# Patient Record
Sex: MALE | Race: WHITE
[De-identification: names, ages, dates, MRNs, and addresses within clinical notes are randomized per-mention and may not be internally consistent; named-entity substitution may affect disease eponyms.]

---

## 2019-07-03 ENCOUNTER — HOSPITAL ENCOUNTER (OUTPATIENT)
Age: 64
End: 2019-07-03
Payer: COMMERCIAL

## 2019-07-03 VITALS — BODY MASS INDEX: 32.1 KG/M2

## 2019-07-03 DIAGNOSIS — Z12.5: ICD-10-CM

## 2019-07-03 DIAGNOSIS — R53.83: ICD-10-CM

## 2019-07-03 DIAGNOSIS — Z00.00: Primary | ICD-10-CM

## 2019-07-03 DIAGNOSIS — Z51.81: ICD-10-CM

## 2019-07-03 DIAGNOSIS — E29.1: ICD-10-CM

## 2019-07-03 LAB
ALANINE AMINOTRANSFER ALT/SGPT: 31 U/L (ref 16–61)
ALBUMIN SERPL-MCNC: 3.8 G/DL (ref 3.2–5)
ALKALINE PHOSPHATASE: 62 U/L (ref 45–117)
ANION GAP: 10 (ref 5–15)
AST(SGOT): 23 U/L (ref 15–37)
BUN SERPL-MCNC: 16 MG/DL (ref 7–18)
BUN/CREAT RATIO: 18 RATIO (ref 10–20)
CALCIUM SERPL-MCNC: 8.5 MG/DL (ref 8.5–10.1)
CARBON DIOXIDE: 26 MMOL/L (ref 21–32)
CHLORIDE: 107 MMOL/L (ref 98–107)
CHOLEST SERPL-MCNC: 136 MG/DL
DEPRECATED RDW RBC: 43.5 FL (ref 35.1–43.9)
DIFFERENTIAL INDICATED: (no result)
ERYTHROCYTE [DISTWIDTH] IN BLOOD: 13.1 % (ref 11.6–14.6)
EST GLOM FILT RATE - AFR AMER: 111 ML/MIN (ref 60–?)
GLOBULIN: 3.1 G/DL (ref 2.2–4.2)
GLUCOSE: 95 MG/DL (ref 74–106)
HCT VFR BLD AUTO: 44.3 % (ref 40–54)
HEMOGLOBIN: 15.3 G/DL (ref 13–16.5)
HGB BLD-MCNC: 15.3 G/DL (ref 13–16.5)
IMMATURE GRANULOCYTES COUNT: 0.02 X10^3/UL (ref 0–0)
MCV RBC: 91.5 FL (ref 80–94)
MEAN CORP HGB CONC: 34.5 G/GL (ref 32–36)
MEAN PLATELET VOL.: 10 FL (ref 6.2–12)
PLATELET # BLD: 164 K/MM3 (ref 150–450)
PLATELET COUNT: 164 K/MM3 (ref 150–450)
POSITIVE COUNT: NO
POSITIVE DIFFERENTIAL: NO
POSITIVE MORPHOLOGY: NO
POTASSIUM: 3.8 MMOL/L (ref 3.5–5.1)
PSA,TOTAL - ANNUAL SCREEN: 1.36 NG/ML (ref 0–4)
RBC # BLD AUTO: 4.84 M/MM3 (ref 4.6–6.2)
RBC DISTRIBUTION WIDTH CV: 13.1 % (ref 11.6–14.6)
RBC DISTRIBUTION WIDTH SD: 43.5 FL (ref 35.1–43.9)
TRIGLYCERIDES: 60 MG/DL
VITAMIN B12: 402 PG/ML (ref 211–911)
VITAMIN D,25 HYDROXY: 51.5 NG/ML (ref 29.95–100.01)
VLDLC SERPL-MCNC: 12 MG/DL (ref 5–40)
WBC # BLD AUTO: 5.4 K/MM3 (ref 4.4–11)
WHITE BLOOD COUNT: 5.4 K/MM3 (ref 4.4–11)

## 2019-07-03 PROCEDURE — 80053 COMPREHEN METABOLIC PANEL: CPT

## 2019-07-03 PROCEDURE — 84402 ASSAY OF FREE TESTOSTERONE: CPT

## 2019-07-03 PROCEDURE — 80061 LIPID PANEL: CPT

## 2019-07-03 PROCEDURE — 84403 ASSAY OF TOTAL TESTOSTERONE: CPT

## 2019-07-03 PROCEDURE — 82306 VITAMIN D 25 HYDROXY: CPT

## 2019-07-03 PROCEDURE — 36415 COLL VENOUS BLD VENIPUNCTURE: CPT

## 2019-07-03 PROCEDURE — 82607 VITAMIN B-12: CPT

## 2019-07-03 PROCEDURE — 84443 ASSAY THYROID STIM HORMONE: CPT

## 2019-07-03 PROCEDURE — 84153 ASSAY OF PSA TOTAL: CPT

## 2019-07-03 PROCEDURE — G0103 PSA SCREENING: HCPCS

## 2019-07-03 PROCEDURE — 85025 COMPLETE CBC W/AUTO DIFF WBC: CPT

## 2019-07-06 LAB — TESTOSTERONE, FREE: 7.04 NG/DL (ref 5–21)

## 2019-07-07 LAB
TESTOST FREE MFR SERPL: 2.14 % (ref 1.5–4.2)
TESTOSTERONE, % FREE: 2.14 % (ref 1.5–4.2)

## 2021-12-13 ENCOUNTER — HOSPITAL ENCOUNTER (OUTPATIENT)
Dept: HOSPITAL 100 - MS3OUT | Age: 66
Discharge: HOME | End: 2021-12-13
Payer: COMMERCIAL

## 2021-12-13 VITALS
SYSTOLIC BLOOD PRESSURE: 123 MMHG | TEMPERATURE: 99.3 F | OXYGEN SATURATION: 94 % | RESPIRATION RATE: 16 BRPM | HEART RATE: 68 BPM | DIASTOLIC BLOOD PRESSURE: 75 MMHG

## 2021-12-13 VITALS
OXYGEN SATURATION: 95 % | SYSTOLIC BLOOD PRESSURE: 137 MMHG | RESPIRATION RATE: 16 BRPM | HEART RATE: 67 BPM | DIASTOLIC BLOOD PRESSURE: 82 MMHG | TEMPERATURE: 99.5 F

## 2021-12-13 VITALS — BODY MASS INDEX: 29.6 KG/M2

## 2021-12-13 VITALS
RESPIRATION RATE: 16 BRPM | SYSTOLIC BLOOD PRESSURE: 122 MMHG | TEMPERATURE: 99.2 F | HEART RATE: 61 BPM | OXYGEN SATURATION: 93 % | DIASTOLIC BLOOD PRESSURE: 66 MMHG

## 2021-12-13 DIAGNOSIS — Z23: Primary | ICD-10-CM

## 2021-12-13 DIAGNOSIS — U07.1: ICD-10-CM

## 2021-12-13 DIAGNOSIS — I10: ICD-10-CM

## 2021-12-13 PROCEDURE — A4216 STERILE WATER/SALINE, 10 ML: HCPCS

## 2021-12-13 PROCEDURE — M0245: HCPCS

## 2021-12-13 RX ADMIN — SODIUM CHLORIDE, PRESERVATIVE FREE 0 ML: 5 INJECTION INTRAVENOUS at 15:32

## 2023-01-31 ENCOUNTER — HOSPITAL ENCOUNTER (OUTPATIENT)
Age: 68
Discharge: HOME | End: 2023-01-31
Payer: COMMERCIAL

## 2023-01-31 DIAGNOSIS — I73.9: Primary | ICD-10-CM

## 2023-01-31 PROCEDURE — 93924 LWR XTR VASC STDY BILAT: CPT

## 2023-09-11 ENCOUNTER — APPOINTMENT (OUTPATIENT)
Dept: URBAN - METROPOLITAN AREA CLINIC 204 | Age: 68
Setting detail: DERMATOLOGY
End: 2023-09-12

## 2023-09-11 PROCEDURE — 99213 OFFICE O/P EST LOW 20 MIN: CPT | Mod: 25

## 2023-09-11 PROCEDURE — OTHER MIPS QUALITY: OTHER

## 2023-09-11 PROCEDURE — 17004 DESTROY PREMAL LESIONS 15/>: CPT

## 2024-02-21 ENCOUNTER — HOSPITAL ENCOUNTER (OUTPATIENT)
Dept: HOSPITAL 100 - LAB | Age: 69
Discharge: HOME | End: 2024-02-21
Payer: COMMERCIAL

## 2024-02-21 DIAGNOSIS — Z00.00: Primary | ICD-10-CM

## 2024-02-21 DIAGNOSIS — Z12.5: ICD-10-CM

## 2024-02-21 LAB
ALANINE AMINOTRANSFER ALT/SGPT: 41 U/L (ref 16–61)
ALBUMIN SERPL-MCNC: 4.2 G/DL (ref 3.2–5)
ALKALINE PHOSPHATASE: 66 U/L (ref 45–117)
ANION GAP: 7 (ref 5–15)
AST(SGOT): 23 U/L (ref 15–37)
BUN SERPL-MCNC: 16 MG/DL (ref 7–18)
BUN/CREAT RATIO: 18.8 RATIO (ref 10–20)
CALCIUM SERPL-MCNC: 8.7 MG/DL (ref 8.5–10.1)
CARBON DIOXIDE: 27 MMOL/L (ref 21–32)
CHLORIDE: 106 MMOL/L (ref 98–107)
CHOLEST SERPL-MCNC: 219 MG/DL
DEPRECATED RDW RBC: 44.9 FL (ref 35.1–43.9)
ERYTHROCYTE [DISTWIDTH] IN BLOOD: 13.3 % (ref 11.6–14.6)
EST GLOM FILT RATE - AFR AMER: 116 ML/MIN (ref 60–?)
GLOBULIN: 3.1 G/DL (ref 2.2–4.2)
GLUCOSE: 105 MG/DL (ref 74–106)
HCT VFR BLD AUTO: 46.3 % (ref 40–54)
HGB BLD-MCNC: 15.7 G/DL (ref 13–16.5)
IMMATURE GRANULOCYTES COUNT: 0.04 X10^3/UL (ref 0–0)
MCV RBC: 90.8 FL (ref 80–94)
MEAN CORP HGB CONC: 33.9 G/DL (ref 32–36)
MEAN PLATELET VOL.: 9.6 FL (ref 6.2–12)
NRBC FLAGGED BY ANALYZER: 0 % (ref 0–5)
PLATELET # BLD: 197 K/MM3 (ref 150–450)
POTASSIUM: 3.5 MMOL/L (ref 3.5–5.1)
PSA,TOTAL - ANNUAL SCREEN: 0.79 NG/ML (ref 0–4)
RBC # BLD AUTO: 5.1 M/MM3 (ref 4.6–6.2)
TRIGLYCERIDES: 80 MG/DL
VLDLC SERPL-MCNC: 16 MG/DL (ref 5–40)
WBC # BLD AUTO: 6 K/MM3 (ref 4.4–11)

## 2024-02-21 PROCEDURE — 80053 COMPREHEN METABOLIC PANEL: CPT

## 2024-02-21 PROCEDURE — 84153 ASSAY OF PSA TOTAL: CPT

## 2024-02-21 PROCEDURE — 36415 COLL VENOUS BLD VENIPUNCTURE: CPT

## 2024-02-21 PROCEDURE — G0103 PSA SCREENING: HCPCS

## 2024-02-21 PROCEDURE — 80061 LIPID PANEL: CPT

## 2024-02-21 PROCEDURE — 85025 COMPLETE CBC W/AUTO DIFF WBC: CPT

## 2024-06-12 ENCOUNTER — APPOINTMENT (OUTPATIENT)
Dept: URBAN - METROPOLITAN AREA CLINIC 204 | Age: 69
Setting detail: DERMATOLOGY
End: 2024-06-13

## 2024-06-12 PROCEDURE — 17004 DESTROY PREMAL LESIONS 15/>: CPT

## 2024-06-12 PROCEDURE — 99213 OFFICE O/P EST LOW 20 MIN: CPT | Mod: 25

## 2024-06-12 PROCEDURE — OTHER MIPS QUALITY: OTHER

## 2024-06-12 PROCEDURE — 17110 DESTRUCT B9 LESION 1-14: CPT | Mod: 59

## 2024-06-12 NOTE — PROCEDURE: MIPS QUALITY
Quality 130: Documentation Of Current Medications In The Medical Record: Current Medications Documented
Additional Notes: Documentation for MIPS  purposes only. Full Patient visit note from paper chart is available for review and also scanned in EMA chart.
Detail Level: Detailed
Quality 226: Preventive Care And Screening: Tobacco Use: Screening And Cessation Intervention: Tobacco Screening not Performed
Quality 47: Advance Care Plan: Advance Care Planning discussed and documented; advance care plan or surrogate decision maker documented in the medical record.

## 2025-04-25 ENCOUNTER — HOSPITAL ENCOUNTER (OUTPATIENT)
Age: 70
Discharge: HOME | End: 2025-04-25
Payer: COMMERCIAL

## 2025-04-25 DIAGNOSIS — I73.89: Primary | ICD-10-CM

## 2025-04-25 PROCEDURE — 93923 UPR/LXTR ART STDY 3+ LVLS: CPT

## 2025-04-29 ENCOUNTER — HOSPITAL ENCOUNTER (OUTPATIENT)
Dept: HOSPITAL 100 - LAB | Age: 70
Discharge: HOME | End: 2025-04-29
Payer: COMMERCIAL

## 2025-04-29 DIAGNOSIS — Z12.5: ICD-10-CM

## 2025-04-29 DIAGNOSIS — I10: Primary | ICD-10-CM

## 2025-04-29 DIAGNOSIS — E78.5: ICD-10-CM

## 2025-04-29 LAB
ALBUMIN SERPL-MCNC: 4.3 G/DL (ref 3.4–4.8)
ALP SERPL-CCNC: 59 U/L (ref 40–129)
ALT SERPL-CCNC: 20 U/L (ref ?–46)
ANION GAP SERPL CALC-SCNC: 11 MMOL/L (ref 5–15)
AST(SGOT): 25 U/L (ref ?–37)
BUN SERPL-MCNC: 19 MG/DL (ref 4–19)
BUN/CREAT SERPL: 21.1 RATIO (ref 10–20)
CALCIUM SERPL-MCNC: 9.3 MG/DL (ref 7.6–11)
CHLORIDE: 104 MMOL/L (ref 98–108)
CHOLEST SERPL-MCNC: 192 MG/DL (ref ?–200)
CHOLESTEROL:HDL RATIO SCREEN: 3.64
CO2 BLDCV-SCNC: 25.7 MMOL/L (ref 21–32)
DEPRECATED RDW RBC: 44.9 FL (ref 35.1–43.9)
ERYTHROCYTE [DISTWIDTH] IN BLOOD: 13.6 % (ref 11.6–14.6)
GLOBULIN: 2.3 G/DL (ref 2.2–4.2)
GLUCOSE SERPL-MCNC: 89 MG/DL (ref 70–99)
HCT VFR BLD AUTO: 42.5 % (ref 40–54)
HGB BLD-MCNC: 14.9 G/DL (ref 13–16.5)
LOW DENSITY LIPOPROTEIN CALC.: 115 MG/DL
MEAN CORP HGB CONC: 35.1 G/DL (ref 32–36)
MEAN PLATELET VOL.: 9.8 FL (ref 6.2–12)
NRBC FLAGGED BY ANALYZER: 0 % (ref 0–5)
PLATELET # BLD: 209 K/MM3 (ref 150–450)
POTASSIUM SPEC-MCNC: 3.8 MMOL/L (ref 3.3–5.1)
PSA,TOTAL - ANNUAL SCREEN: 0.62 NG/ML (ref 0.02–4)
RBC # BLD AUTO: 4.72 M/MM3 (ref 4.6–6.2)
TRIGLYCERIDES: 121 MG/DL
VLDLC SERPL-MCNC: 24 MG/DL (ref 5–40)
WBC # BLD AUTO: 5.9 K/MM3 (ref 4.4–11)

## 2025-04-29 PROCEDURE — 80053 COMPREHEN METABOLIC PANEL: CPT

## 2025-04-29 PROCEDURE — 80061 LIPID PANEL: CPT

## 2025-04-29 PROCEDURE — 84153 ASSAY OF PSA TOTAL: CPT

## 2025-04-29 PROCEDURE — 85025 COMPLETE CBC W/AUTO DIFF WBC: CPT

## 2025-04-29 PROCEDURE — 36415 COLL VENOUS BLD VENIPUNCTURE: CPT

## 2025-04-29 PROCEDURE — G0103 PSA SCREENING: HCPCS

## 2025-05-09 ENCOUNTER — HOSPITAL ENCOUNTER (OUTPATIENT)
Dept: HOSPITAL 100 - SDC | Age: 70
Discharge: HOME | End: 2025-05-09
Payer: COMMERCIAL

## 2025-05-09 VITALS
HEART RATE: 52 BPM | RESPIRATION RATE: 16 BRPM | DIASTOLIC BLOOD PRESSURE: 85 MMHG | OXYGEN SATURATION: 96 % | SYSTOLIC BLOOD PRESSURE: 126 MMHG | TEMPERATURE: 97.34 F

## 2025-05-09 VITALS
TEMPERATURE: 97.16 F | RESPIRATION RATE: 18 BRPM | DIASTOLIC BLOOD PRESSURE: 85 MMHG | SYSTOLIC BLOOD PRESSURE: 149 MMHG | HEART RATE: 53 BPM | OXYGEN SATURATION: 96 %

## 2025-05-09 VITALS
HEART RATE: 52 BPM | DIASTOLIC BLOOD PRESSURE: 74 MMHG | RESPIRATION RATE: 18 BRPM | OXYGEN SATURATION: 94 % | SYSTOLIC BLOOD PRESSURE: 109 MMHG

## 2025-05-09 VITALS
OXYGEN SATURATION: 98 % | TEMPERATURE: 98.2 F | HEART RATE: 57 BPM | RESPIRATION RATE: 16 BRPM | SYSTOLIC BLOOD PRESSURE: 149 MMHG | DIASTOLIC BLOOD PRESSURE: 85 MMHG

## 2025-05-09 VITALS
OXYGEN SATURATION: 95 % | HEART RATE: 54 BPM | HEART RATE: 61 BPM | OXYGEN SATURATION: 96 % | DIASTOLIC BLOOD PRESSURE: 83 MMHG | SYSTOLIC BLOOD PRESSURE: 149 MMHG | SYSTOLIC BLOOD PRESSURE: 139 MMHG | DIASTOLIC BLOOD PRESSURE: 83 MMHG | RESPIRATION RATE: 20 BRPM | TEMPERATURE: 97.7 F | TEMPERATURE: 97.7 F | RESPIRATION RATE: 16 BRPM

## 2025-05-09 VITALS
SYSTOLIC BLOOD PRESSURE: 149 MMHG | RESPIRATION RATE: 16 BRPM | TEMPERATURE: 98.2 F | OXYGEN SATURATION: 98 % | HEART RATE: 57 BPM | DIASTOLIC BLOOD PRESSURE: 85 MMHG

## 2025-05-09 DIAGNOSIS — M20.42: ICD-10-CM

## 2025-05-09 DIAGNOSIS — M79.675: ICD-10-CM

## 2025-05-09 DIAGNOSIS — F41.9: ICD-10-CM

## 2025-05-09 DIAGNOSIS — Z79.82: ICD-10-CM

## 2025-05-09 DIAGNOSIS — Z79.899: ICD-10-CM

## 2025-05-09 DIAGNOSIS — L97.522: Primary | ICD-10-CM

## 2025-05-09 DIAGNOSIS — G89.29: ICD-10-CM

## 2025-05-09 DIAGNOSIS — I10: ICD-10-CM

## 2025-05-09 DIAGNOSIS — L85.9: ICD-10-CM

## 2025-05-09 DIAGNOSIS — Z87.891: ICD-10-CM

## 2025-05-09 DIAGNOSIS — M06.9: ICD-10-CM

## 2025-05-09 DIAGNOSIS — F32.A: ICD-10-CM

## 2025-05-09 DIAGNOSIS — E78.5: ICD-10-CM

## 2025-05-09 PROCEDURE — 64450 NJX AA&/STRD OTHER PN/BRANCH: CPT

## 2025-05-09 PROCEDURE — 01480 ANES OPEN PX LOWER L/A/F NOS: CPT

## 2025-05-09 PROCEDURE — 88311 DECALCIFY TISSUE: CPT

## 2025-05-09 PROCEDURE — 88305 TISSUE EXAM BY PATHOLOGIST: CPT

## 2025-05-09 PROCEDURE — 28820 AMPUTATION OF TOE: CPT

## 2025-05-09 RX ADMIN — CEFAZOLIN 150 GM: 10 INJECTION, POWDER, FOR SOLUTION INTRAVENOUS at 07:40
